# Patient Record
Sex: FEMALE | Race: WHITE | NOT HISPANIC OR LATINO | URBAN - METROPOLITAN AREA
[De-identification: names, ages, dates, MRNs, and addresses within clinical notes are randomized per-mention and may not be internally consistent; named-entity substitution may affect disease eponyms.]

---

## 2022-01-29 ENCOUNTER — PREPPED CHART (OUTPATIENT)
Dept: URBAN - METROPOLITAN AREA CLINIC 67 | Facility: CLINIC | Age: 62
End: 2022-01-29

## 2022-01-29 PROBLEM — H43.812 POSTERIOR VITREOUS DETACHMENT: Noted: 2022-01-29

## 2022-01-29 PROBLEM — H35.712 CSR: Noted: 2022-01-29

## 2022-01-29 PROBLEM — H43.812 POSTERIOR VITREOUS DETACHMENT: Status: STABILIZING | Noted: 2022-01-29

## 2022-01-29 PROBLEM — H43.391 VITREOUS FLOATERS: Noted: 2022-01-29

## 2022-01-29 PROBLEM — D31.32 CHOROIDAL NEVUS: Status: STABILIZING | Noted: 2022-01-29

## 2022-01-29 PROBLEM — H43.391 VITREOUS FLOATERS: Status: STABILIZING | Noted: 2022-01-29

## 2022-01-29 PROBLEM — H35.712 CSR: Status: STABILIZING | Noted: 2022-01-29

## 2022-01-29 PROBLEM — H33.322 ATROPHIC RETINAL HOLE: Status: WELL-CONTROLLED | Noted: 2022-01-29

## 2022-01-29 PROBLEM — H35.052 CHOROIDAL NEOVASCULARIZATION: Status: STABILIZING | Noted: 2022-01-29

## 2022-01-29 PROBLEM — H33.322 ATROPHIC RETINAL HOLE: Noted: 2022-01-29

## 2022-01-29 PROBLEM — H43.821 VITREOMACULAR ADHESION: Status: STABILIZING | Noted: 2022-01-29

## 2022-01-29 PROBLEM — D31.32 CHOROIDAL NEVUS: Noted: 2022-01-29

## 2022-01-29 PROBLEM — H43.821 VITREOMACULAR ADHESION: Noted: 2022-01-29

## 2022-01-29 PROBLEM — H35.052 CHOROIDAL NEOVASCULARIZATION: Noted: 2022-01-29

## 2022-03-15 ASSESSMENT — VISUAL ACUITY
OS_SC: 20/70-3
OS_PH: 20/25-2
OD_SC: 20/30-3

## 2022-03-15 ASSESSMENT — TONOMETRY
OD_IOP_MMHG: 14
OS_IOP_MMHG: 17

## 2022-04-02 ENCOUNTER — FOLLOW UP (OUTPATIENT)
Dept: URBAN - METROPOLITAN AREA CLINIC 67 | Facility: CLINIC | Age: 62
End: 2022-04-02

## 2022-04-02 DIAGNOSIS — H43.821: ICD-10-CM

## 2022-04-02 DIAGNOSIS — H33.322: ICD-10-CM

## 2022-04-02 DIAGNOSIS — H35.3221: ICD-10-CM

## 2022-04-02 DIAGNOSIS — H35.052: ICD-10-CM

## 2022-04-02 DIAGNOSIS — H35.712: ICD-10-CM

## 2022-04-02 PROCEDURE — 67028 INJECTION EYE DRUG: CPT

## 2022-04-02 PROCEDURE — 92202 OPSCPY EXTND ON/MAC DRAW: CPT | Mod: 59,RT

## 2022-04-02 PROCEDURE — PFS EYLEA PFS

## 2022-04-02 PROCEDURE — 92014 COMPRE OPH EXAM EST PT 1/>: CPT | Mod: 25

## 2022-04-02 PROCEDURE — 92134 CPTRZ OPH DX IMG PST SGM RTA: CPT

## 2022-04-02 ASSESSMENT — TONOMETRY
OD_IOP_MMHG: 13
OS_IOP_MMHG: 16

## 2022-04-02 ASSESSMENT — VISUAL ACUITY
OS_PH: 20/30
OD_SC: 20/25-2
OS_SC: 20/70-1

## 2022-06-25 ENCOUNTER — FOLLOW UP (OUTPATIENT)
Dept: URBAN - METROPOLITAN AREA CLINIC 67 | Facility: CLINIC | Age: 62
End: 2022-06-25

## 2022-06-25 DIAGNOSIS — H35.712: ICD-10-CM

## 2022-06-25 DIAGNOSIS — H35.3221: ICD-10-CM

## 2022-06-25 DIAGNOSIS — H43.821: ICD-10-CM

## 2022-06-25 DIAGNOSIS — H35.052: ICD-10-CM

## 2022-06-25 DIAGNOSIS — H33.322: ICD-10-CM

## 2022-06-25 PROCEDURE — 92014 COMPRE OPH EXAM EST PT 1/>: CPT | Mod: 25

## 2022-06-25 PROCEDURE — PFS EYLEA PFS

## 2022-06-25 PROCEDURE — 67028 INJECTION EYE DRUG: CPT

## 2022-06-25 PROCEDURE — 92202 OPSCPY EXTND ON/MAC DRAW: CPT | Mod: 59

## 2022-06-25 PROCEDURE — 92134 CPTRZ OPH DX IMG PST SGM RTA: CPT

## 2022-06-25 ASSESSMENT — VISUAL ACUITY
OS_PH: 20/25-2
OD_PH: 20/20
OS_SC: 20/40-2
OD_SC: 20/25-2

## 2022-06-25 ASSESSMENT — TONOMETRY
OS_IOP_MMHG: 14
OD_IOP_MMHG: 12

## 2022-08-31 ENCOUNTER — APPOINTMENT (RX ONLY)
Dept: URBAN - METROPOLITAN AREA CLINIC 40 | Facility: CLINIC | Age: 62
Setting detail: DERMATOLOGY
End: 2022-08-31

## 2022-08-31 DIAGNOSIS — Z41.9 ENCOUNTER FOR PROCEDURE FOR PURPOSES OTHER THAN REMEDYING HEALTH STATE, UNSPECIFIED: ICD-10-CM

## 2022-08-31 PROCEDURE — ? HYDRAFACIAL

## 2022-08-31 ASSESSMENT — LOCATION SIMPLE DESCRIPTION DERM
LOCATION SIMPLE: SUPERIOR FOREHEAD
LOCATION SIMPLE: CHIN
LOCATION SIMPLE: RIGHT CHEEK
LOCATION SIMPLE: LEFT CHEEK
LOCATION SIMPLE: NOSE

## 2022-08-31 ASSESSMENT — LOCATION ZONE DERM
LOCATION ZONE: NOSE
LOCATION ZONE: FACE

## 2022-08-31 ASSESSMENT — LOCATION DETAILED DESCRIPTION DERM
LOCATION DETAILED: LEFT CHIN
LOCATION DETAILED: SUPERIOR MID FOREHEAD
LOCATION DETAILED: NASAL DORSUM
LOCATION DETAILED: LEFT INFERIOR CENTRAL MALAR CHEEK
LOCATION DETAILED: RIGHT INFERIOR CENTRAL MALAR CHEEK

## 2022-08-31 NOTE — PROCEDURE: HYDRAFACIAL
Solution: Activ-4
Vacuum Pressure Low Setting (Will Not Render If Set To 0): 0
Tip: Hydropeel Tip, Clear
Indication: prominent pores
Treatment Number: 1
Tip: Hydropeel Tip, Teal
Tip Override
Comments: .\\n\\n\\n[employer] Deluxe. Orange exfoliation tip, glysal prep, extractions, soothing facial rinse, brightenol, protec, dermal repair, phyto corrective gel, intellishade clear. 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 She is also interested in injectables. I also suggested that she add a retinol to her home care routine.
Solution Override
Location: face
Solution: Beta-HD
Tip: Hydropeel Tip, Blue
Consent: Written consent obtained, risks reviewed including but not limited to crusting, scabbing, blistering, scarring, darker or lighter pigmentary change, bruising, and/or incomplete response.
Post-Care Instructions: I reviewed with the patient in detail post-care instructions. Patient should stay away from the sun and wear sun protection until treated areas are fully healed.
Solution: GlySal 7.5%
Price (Use Numbers Only, No Special Characters Or $): 300

## 2022-08-31 NOTE — HPI: COSMETIC (FACIAL)
Additional History: .\\n\\n\\nPrimary concerns are visible pores, especially on the chin. \\n\\nAllergies - penicillin\\n\\nHas a history of botox and fillers. She works in  and is rigorous about her home care. \\n\\nCurrent home care is [facility]-\\nCleanser\\ntoner\\nEye Cream\\nSerums\\nSPF 20 daily\\nnight cream\\nweekly mask\\n\\nPt would like a deep clean facial today focusing on extractions

## 2022-09-22 ENCOUNTER — APPOINTMENT (RX ONLY)
Dept: URBAN - METROPOLITAN AREA CLINIC 40 | Facility: CLINIC | Age: 62
Setting detail: DERMATOLOGY
End: 2022-09-22

## 2022-09-22 DIAGNOSIS — L66.8 OTHER CICATRICIAL ALOPECIA: ICD-10-CM | Status: INADEQUATELY CONTROLLED

## 2022-09-22 DIAGNOSIS — L66.12 FRONTAL FIBROSING ALOPECIA: ICD-10-CM | Status: INADEQUATELY CONTROLLED

## 2022-09-22 PROCEDURE — ? DIAGNOSIS COMMENT

## 2022-09-22 PROCEDURE — 99214 OFFICE O/P EST MOD 30 MIN: CPT

## 2022-09-22 PROCEDURE — ? PHOTO-DOCUMENTATION

## 2022-09-22 PROCEDURE — ? ADDITIONAL NOTES

## 2022-09-22 PROCEDURE — ? COUNSELING

## 2022-09-22 PROCEDURE — ? PRESCRIPTION

## 2022-09-22 PROCEDURE — ? ORDER TESTS

## 2022-09-22 PROCEDURE — ? PRESCRIPTION MEDICATION MANAGEMENT

## 2022-09-22 RX ORDER — TACROLIMUS 1 MG/G
OINTMENT TOPICAL
Qty: 60 | Refills: 0 | Status: ERX | COMMUNITY
Start: 2022-09-22

## 2022-09-22 RX ADMIN — TACROLIMUS: 1 OINTMENT TOPICAL at 00:00

## 2022-09-22 ASSESSMENT — LOCATION ZONE DERM
LOCATION ZONE: SCALP
LOCATION ZONE: FACE

## 2022-09-22 ASSESSMENT — LOCATION SIMPLE DESCRIPTION DERM
LOCATION SIMPLE: POSTERIOR SCALP
LOCATION SIMPLE: LEFT FOREHEAD
LOCATION SIMPLE: RIGHT SCALP

## 2022-09-22 ASSESSMENT — LOCATION DETAILED DESCRIPTION DERM
LOCATION DETAILED: LEFT SUPERIOR OCCIPITAL SCALP
LOCATION DETAILED: RIGHT MEDIAL FRONTAL SCALP
LOCATION DETAILED: LEFT SUPERIOR FOREHEAD

## 2022-09-22 NOTE — PROCEDURE: DIAGNOSIS COMMENT
Detail Level: Simple
Render Risk Assessment In Note?: no
Comment: Patient states biopsy was done a few years ago- Dr. Noyola’s office, will have Pt sign consent to get results. Discussed topi tacrolimus and ILK injections. Patient would like to start with topical Rx today. BW order given to patient today.

## 2022-09-22 NOTE — PROCEDURE: ORDER TESTS
Expected Date Of Service: 09/22/2022
Performing Laboratory: 0
Bill For Surgical Tray: no
Billing Type: Third-Party Bill

## 2022-09-22 NOTE — PROCEDURE: PRESCRIPTION MEDICATION MANAGEMENT
Initiate Treatment: - tacrolimus 0.1 % topical ointment \\nSig: Apply to AA on scalp daily
Detail Level: Zone
Render In Strict Bullet Format?: No
Plan: - recommended proscriptix or nutrafol

## 2022-09-28 RX ORDER — TACROLIMUS 1 MG/G
OINTMENT TOPICAL
Qty: 60 | Refills: 0 | Status: CANCELLED
Stop reason: CLARIF

## 2022-10-01 ENCOUNTER — FOLLOW UP (OUTPATIENT)
Dept: URBAN - METROPOLITAN AREA CLINIC 67 | Facility: CLINIC | Age: 62
End: 2022-10-01

## 2022-10-01 DIAGNOSIS — H43.812: ICD-10-CM

## 2022-10-01 DIAGNOSIS — H33.322: ICD-10-CM

## 2022-10-01 DIAGNOSIS — H43.391: ICD-10-CM

## 2022-10-01 DIAGNOSIS — H35.712: ICD-10-CM

## 2022-10-01 DIAGNOSIS — H35.052: ICD-10-CM

## 2022-10-01 DIAGNOSIS — D31.32: ICD-10-CM

## 2022-10-01 DIAGNOSIS — H43.821: ICD-10-CM

## 2022-10-01 DIAGNOSIS — H35.3221: ICD-10-CM

## 2022-10-01 PROCEDURE — 92134 CPTRZ OPH DX IMG PST SGM RTA: CPT

## 2022-10-01 PROCEDURE — 92202 OPSCPY EXTND ON/MAC DRAW: CPT

## 2022-10-01 PROCEDURE — 92014 COMPRE OPH EXAM EST PT 1/>: CPT

## 2022-10-01 ASSESSMENT — VISUAL ACUITY
OD_SC: 20/20
OS_PH: 20/25-1
OS_SC: 20/30

## 2022-10-01 ASSESSMENT — TONOMETRY
OS_IOP_MMHG: 12
OD_IOP_MMHG: 10

## 2022-12-03 ENCOUNTER — FOLLOW UP (OUTPATIENT)
Dept: URBAN - METROPOLITAN AREA CLINIC 67 | Facility: CLINIC | Age: 62
End: 2022-12-03

## 2022-12-03 DIAGNOSIS — H43.812: ICD-10-CM

## 2022-12-03 DIAGNOSIS — H35.3221: ICD-10-CM

## 2022-12-03 DIAGNOSIS — H33.322: ICD-10-CM

## 2022-12-03 DIAGNOSIS — H43.391: ICD-10-CM

## 2022-12-03 DIAGNOSIS — H25.13: ICD-10-CM

## 2022-12-03 DIAGNOSIS — H43.821: ICD-10-CM

## 2022-12-03 DIAGNOSIS — H35.052: ICD-10-CM

## 2022-12-03 DIAGNOSIS — D31.32: ICD-10-CM

## 2022-12-03 DIAGNOSIS — H35.712: ICD-10-CM

## 2022-12-03 PROCEDURE — 92014 COMPRE OPH EXAM EST PT 1/>: CPT

## 2022-12-03 PROCEDURE — 92201 OPSCPY EXTND RTA DRAW UNI/BI: CPT

## 2022-12-03 PROCEDURE — 92134 CPTRZ OPH DX IMG PST SGM RTA: CPT

## 2022-12-03 ASSESSMENT — VISUAL ACUITY
OS_SC: 20/40-1
OD_PH: 20/20
OS_PH: 20/20-3
OD_SC: 20/25-1

## 2022-12-03 ASSESSMENT — TONOMETRY
OD_IOP_MMHG: 12
OS_IOP_MMHG: 16

## 2023-02-25 ENCOUNTER — FOLLOW UP (OUTPATIENT)
Dept: URBAN - METROPOLITAN AREA CLINIC 67 | Facility: CLINIC | Age: 63
End: 2023-02-25

## 2023-02-25 DIAGNOSIS — H43.821: ICD-10-CM

## 2023-02-25 DIAGNOSIS — H35.052: ICD-10-CM

## 2023-02-25 DIAGNOSIS — H33.322: ICD-10-CM

## 2023-02-25 DIAGNOSIS — H35.712: ICD-10-CM

## 2023-02-25 DIAGNOSIS — H25.13: ICD-10-CM

## 2023-02-25 DIAGNOSIS — D31.32: ICD-10-CM

## 2023-02-25 DIAGNOSIS — H43.812: ICD-10-CM

## 2023-02-25 DIAGNOSIS — H35.3221: ICD-10-CM

## 2023-02-25 DIAGNOSIS — H43.391: ICD-10-CM

## 2023-02-25 DIAGNOSIS — H35.072: ICD-10-CM

## 2023-02-25 PROCEDURE — 92134 CPTRZ OPH DX IMG PST SGM RTA: CPT

## 2023-02-25 PROCEDURE — 92014 COMPRE OPH EXAM EST PT 1/>: CPT

## 2023-02-25 PROCEDURE — 92201 OPSCPY EXTND RTA DRAW UNI/BI: CPT

## 2023-02-25 ASSESSMENT — VISUAL ACUITY
OD_SC: 20/25-1
OS_SC: 20/40-3
OD_PH: 20/20-1

## 2023-02-25 ASSESSMENT — TONOMETRY
OD_IOP_MMHG: 15
OS_IOP_MMHG: 15

## 2023-05-20 ENCOUNTER — FOLLOW UP (OUTPATIENT)
Dept: URBAN - METROPOLITAN AREA CLINIC 67 | Facility: CLINIC | Age: 63
End: 2023-05-20

## 2023-05-20 DIAGNOSIS — H43.821: ICD-10-CM

## 2023-05-20 DIAGNOSIS — H35.3221: ICD-10-CM

## 2023-05-20 DIAGNOSIS — H35.072: ICD-10-CM

## 2023-05-20 DIAGNOSIS — H33.322: ICD-10-CM

## 2023-05-20 DIAGNOSIS — H43.812: ICD-10-CM

## 2023-05-20 DIAGNOSIS — H43.391: ICD-10-CM

## 2023-05-20 DIAGNOSIS — H35.712: ICD-10-CM

## 2023-05-20 DIAGNOSIS — D31.32: ICD-10-CM

## 2023-05-20 PROCEDURE — 92134 CPTRZ OPH DX IMG PST SGM RTA: CPT

## 2023-05-20 PROCEDURE — J0178S EYLEA PFS SAMPLE

## 2023-05-20 PROCEDURE — 92202 OPSCPY EXTND ON/MAC DRAW: CPT | Mod: 59

## 2023-05-20 PROCEDURE — 67028 INJECTION EYE DRUG: CPT

## 2023-05-20 PROCEDURE — 92014 COMPRE OPH EXAM EST PT 1/>: CPT | Mod: 25

## 2023-05-20 ASSESSMENT — TONOMETRY
OS_IOP_MMHG: 15
OD_IOP_MMHG: 15

## 2023-05-20 ASSESSMENT — VISUAL ACUITY
OS_CC: 20/50-2
OD_CC: 20/30+2

## 2023-07-01 ENCOUNTER — FOLLOW UP (OUTPATIENT)
Dept: URBAN - METROPOLITAN AREA CLINIC 67 | Facility: CLINIC | Age: 63
End: 2023-07-01

## 2023-07-01 DIAGNOSIS — H43.812: ICD-10-CM

## 2023-07-01 DIAGNOSIS — H35.712: ICD-10-CM

## 2023-07-01 DIAGNOSIS — H35.371: ICD-10-CM

## 2023-07-01 DIAGNOSIS — H35.3221: ICD-10-CM

## 2023-07-01 DIAGNOSIS — H43.821: ICD-10-CM

## 2023-07-01 PROCEDURE — 92134 CPTRZ OPH DX IMG PST SGM RTA: CPT

## 2023-07-01 PROCEDURE — 92014 COMPRE OPH EXAM EST PT 1/>: CPT

## 2023-07-01 PROCEDURE — 92201 OPSCPY EXTND RTA DRAW UNI/BI: CPT

## 2023-07-01 ASSESSMENT — VISUAL ACUITY
OS_PH: 20/20-2
OD_SC: 20/25-1
OS_SC: 20/40-2

## 2023-07-01 ASSESSMENT — TONOMETRY
OD_IOP_MMHG: 10
OS_IOP_MMHG: 11

## 2023-08-12 ENCOUNTER — FOLLOW UP (OUTPATIENT)
Dept: URBAN - METROPOLITAN AREA CLINIC 67 | Facility: CLINIC | Age: 63
End: 2023-08-12

## 2023-08-12 DIAGNOSIS — H43.821: ICD-10-CM

## 2023-08-12 DIAGNOSIS — H35.3221: ICD-10-CM

## 2023-08-12 DIAGNOSIS — H35.371: ICD-10-CM

## 2023-08-12 PROCEDURE — 67028 INJECTION EYE DRUG: CPT

## 2023-08-12 PROCEDURE — 92134 CPTRZ OPH DX IMG PST SGM RTA: CPT

## 2023-08-12 PROCEDURE — 92014 COMPRE OPH EXAM EST PT 1/>: CPT | Mod: 25

## 2023-08-12 PROCEDURE — PFS EYLEA PFS: Mod: JZ

## 2023-08-12 PROCEDURE — 92202 OPSCPY EXTND ON/MAC DRAW: CPT | Mod: 59

## 2023-08-12 ASSESSMENT — VISUAL ACUITY
OS_PH: 20/25-1
OD_CC: 20/30
OS_CC: 20/50-1
OD_PH: 20/20-2

## 2023-08-12 ASSESSMENT — TONOMETRY
OS_IOP_MMHG: 15
OD_IOP_MMHG: 12

## 2023-09-23 ENCOUNTER — FOLLOW UP (OUTPATIENT)
Dept: URBAN - METROPOLITAN AREA CLINIC 67 | Facility: CLINIC | Age: 63
End: 2023-09-23

## 2023-09-23 DIAGNOSIS — H33.322: ICD-10-CM

## 2023-09-23 DIAGNOSIS — H35.072: ICD-10-CM

## 2023-09-23 DIAGNOSIS — H43.812: ICD-10-CM

## 2023-09-23 DIAGNOSIS — H43.821: ICD-10-CM

## 2023-09-23 DIAGNOSIS — H35.3221: ICD-10-CM

## 2023-09-23 PROCEDURE — 92134 CPTRZ OPH DX IMG PST SGM RTA: CPT

## 2023-09-23 PROCEDURE — 92202 OPSCPY EXTND ON/MAC DRAW: CPT

## 2023-09-23 PROCEDURE — 92014 COMPRE OPH EXAM EST PT 1/>: CPT

## 2023-09-23 ASSESSMENT — VISUAL ACUITY
OS_CC: 20/40-2
OD_CC: 20/25+2

## 2023-09-23 ASSESSMENT — TONOMETRY
OD_IOP_MMHG: 13
OS_IOP_MMHG: 14

## 2023-11-04 ENCOUNTER — FOLLOW UP (OUTPATIENT)
Dept: URBAN - METROPOLITAN AREA CLINIC 67 | Facility: CLINIC | Age: 63
End: 2023-11-04

## 2023-11-04 DIAGNOSIS — H35.072: ICD-10-CM

## 2023-11-04 DIAGNOSIS — H43.821: ICD-10-CM

## 2023-11-04 DIAGNOSIS — D31.32: ICD-10-CM

## 2023-11-04 DIAGNOSIS — H43.812: ICD-10-CM

## 2023-11-04 DIAGNOSIS — H35.3221: ICD-10-CM

## 2023-11-04 DIAGNOSIS — H35.371: ICD-10-CM

## 2023-11-04 DIAGNOSIS — H43.391: ICD-10-CM

## 2023-11-04 PROCEDURE — 92014 COMPRE OPH EXAM EST PT 1/>: CPT

## 2023-11-04 PROCEDURE — 92134 CPTRZ OPH DX IMG PST SGM RTA: CPT

## 2023-11-04 ASSESSMENT — TONOMETRY
OS_IOP_MMHG: 13
OD_IOP_MMHG: 12

## 2023-11-04 ASSESSMENT — VISUAL ACUITY
OD_PH: 20/20-1
OD_CC: 20/25-1
OS_CC: 20/40-2
OS_PH: 20/25-2

## 2024-01-06 ENCOUNTER — FOLLOW UP (OUTPATIENT)
Dept: URBAN - METROPOLITAN AREA CLINIC 67 | Facility: CLINIC | Age: 64
End: 2024-01-06

## 2024-01-06 DIAGNOSIS — H35.371: ICD-10-CM

## 2024-01-06 DIAGNOSIS — H35.3221: ICD-10-CM

## 2024-01-06 PROCEDURE — 67028 INJECTION EYE DRUG: CPT

## 2024-01-06 PROCEDURE — 92202 OPSCPY EXTND ON/MAC DRAW: CPT | Mod: 59

## 2024-01-06 PROCEDURE — 92134 CPTRZ OPH DX IMG PST SGM RTA: CPT

## 2024-01-06 PROCEDURE — PFS EYLEA PFS: Mod: JZ

## 2024-01-06 PROCEDURE — 92014 COMPRE OPH EXAM EST PT 1/>: CPT | Mod: 25

## 2024-01-06 ASSESSMENT — VISUAL ACUITY
OS_PH: 20/25-1
OD_PH: 20/20
OS_SC: 20/40-2
OD_SC: 20/40+2

## 2024-01-06 ASSESSMENT — TONOMETRY
OD_IOP_MMHG: 12
OS_IOP_MMHG: 14

## 2024-03-09 ENCOUNTER — FOLLOW UP (OUTPATIENT)
Dept: URBAN - METROPOLITAN AREA CLINIC 67 | Facility: CLINIC | Age: 64
End: 2024-03-09

## 2024-03-09 DIAGNOSIS — H43.812: ICD-10-CM

## 2024-03-09 DIAGNOSIS — H43.391: ICD-10-CM

## 2024-03-09 DIAGNOSIS — H35.072: ICD-10-CM

## 2024-03-09 DIAGNOSIS — H33.322: ICD-10-CM

## 2024-03-09 DIAGNOSIS — H43.821: ICD-10-CM

## 2024-03-09 DIAGNOSIS — D31.32: ICD-10-CM

## 2024-03-09 DIAGNOSIS — H35.371: ICD-10-CM

## 2024-03-09 DIAGNOSIS — H25.13: ICD-10-CM

## 2024-03-09 DIAGNOSIS — H35.3221: ICD-10-CM

## 2024-03-09 DIAGNOSIS — H35.712: ICD-10-CM

## 2024-03-09 PROCEDURE — 92014 COMPRE OPH EXAM EST PT 1/>: CPT | Mod: 25

## 2024-03-09 PROCEDURE — 92202 OPSCPY EXTND ON/MAC DRAW: CPT | Mod: 59

## 2024-03-09 PROCEDURE — PFS EYLEA PFS: Mod: JZ

## 2024-03-09 PROCEDURE — 67028 INJECTION EYE DRUG: CPT

## 2024-03-09 PROCEDURE — 92134 CPTRZ OPH DX IMG PST SGM RTA: CPT

## 2024-03-09 ASSESSMENT — TONOMETRY
OD_IOP_MMHG: 14
OS_IOP_MMHG: 14

## 2024-03-09 ASSESSMENT — VISUAL ACUITY
OS_CC: 20/50+1
OS_PH: 20/30-2
OD_CC: 20/25

## 2024-05-11 ENCOUNTER — FOLLOW UP (OUTPATIENT)
Dept: URBAN - METROPOLITAN AREA CLINIC 67 | Facility: CLINIC | Age: 64
End: 2024-05-11

## 2024-05-11 DIAGNOSIS — H35.371: ICD-10-CM

## 2024-05-11 DIAGNOSIS — D31.32: ICD-10-CM

## 2024-05-11 DIAGNOSIS — H43.821: ICD-10-CM

## 2024-05-11 DIAGNOSIS — H35.712: ICD-10-CM

## 2024-05-11 DIAGNOSIS — H25.13: ICD-10-CM

## 2024-05-11 DIAGNOSIS — H33.322: ICD-10-CM

## 2024-05-11 DIAGNOSIS — H43.391: ICD-10-CM

## 2024-05-11 DIAGNOSIS — H04.123: ICD-10-CM

## 2024-05-11 DIAGNOSIS — H35.3221: ICD-10-CM

## 2024-05-11 DIAGNOSIS — H35.072: ICD-10-CM

## 2024-05-11 DIAGNOSIS — H43.812: ICD-10-CM

## 2024-05-11 PROCEDURE — 92134 CPTRZ OPH DX IMG PST SGM RTA: CPT

## 2024-05-11 PROCEDURE — 92202 OPSCPY EXTND ON/MAC DRAW: CPT | Mod: 59

## 2024-05-11 PROCEDURE — PFS EYLEA PFS: Mod: JZ

## 2024-05-11 PROCEDURE — 67028 INJECTION EYE DRUG: CPT

## 2024-05-11 PROCEDURE — 92014 COMPRE OPH EXAM EST PT 1/>: CPT | Mod: 25

## 2024-05-11 ASSESSMENT — TONOMETRY
OD_IOP_MMHG: 10
OS_IOP_MMHG: 10

## 2024-05-11 ASSESSMENT — VISUAL ACUITY
OD_CC: 20/30
OS_CC: 20/50-3
OS_PH: 20/40

## 2024-08-03 ENCOUNTER — FOLLOW UP (OUTPATIENT)
Dept: URBAN - METROPOLITAN AREA CLINIC 67 | Facility: CLINIC | Age: 64
End: 2024-08-03

## 2024-08-03 DIAGNOSIS — H43.391: ICD-10-CM

## 2024-08-03 DIAGNOSIS — H04.123: ICD-10-CM

## 2024-08-03 DIAGNOSIS — D31.32: ICD-10-CM

## 2024-08-03 DIAGNOSIS — H35.371: ICD-10-CM

## 2024-08-03 DIAGNOSIS — H43.812: ICD-10-CM

## 2024-08-03 DIAGNOSIS — H35.3221: ICD-10-CM

## 2024-08-03 DIAGNOSIS — H33.322: ICD-10-CM

## 2024-08-03 DIAGNOSIS — H35.712: ICD-10-CM

## 2024-08-03 DIAGNOSIS — H43.821: ICD-10-CM

## 2024-08-03 DIAGNOSIS — H25.13: ICD-10-CM

## 2024-08-03 DIAGNOSIS — H35.072: ICD-10-CM

## 2024-08-03 PROCEDURE — PFS EYLEA PFS: Mod: JZ

## 2024-08-03 PROCEDURE — 67028 INJECTION EYE DRUG: CPT

## 2024-08-03 PROCEDURE — 92014 COMPRE OPH EXAM EST PT 1/>: CPT | Mod: 25

## 2024-08-03 PROCEDURE — 92134 CPTRZ OPH DX IMG PST SGM RTA: CPT

## 2024-08-03 PROCEDURE — 92202 OPSCPY EXTND ON/MAC DRAW: CPT | Mod: 59

## 2024-08-03 ASSESSMENT — VISUAL ACUITY
OD_CC: 20/30
OS_CC: 20/50-1
OS_PH: 20/40

## 2024-08-03 ASSESSMENT — TONOMETRY
OD_IOP_MMHG: 11
OS_IOP_MMHG: 10

## 2024-11-02 ENCOUNTER — FOLLOW UP (OUTPATIENT)
Dept: URBAN - METROPOLITAN AREA CLINIC 67 | Facility: CLINIC | Age: 64
End: 2024-11-02

## 2024-11-02 DIAGNOSIS — D31.32: ICD-10-CM

## 2024-11-02 DIAGNOSIS — H35.371: ICD-10-CM

## 2024-11-02 DIAGNOSIS — H43.391: ICD-10-CM

## 2024-11-02 DIAGNOSIS — H33.322: ICD-10-CM

## 2024-11-02 DIAGNOSIS — H43.821: ICD-10-CM

## 2024-11-02 DIAGNOSIS — H35.712: ICD-10-CM

## 2024-11-02 DIAGNOSIS — H35.072: ICD-10-CM

## 2024-11-02 DIAGNOSIS — H43.812: ICD-10-CM

## 2024-11-02 DIAGNOSIS — H35.3221: ICD-10-CM

## 2024-11-02 PROCEDURE — 92014 COMPRE OPH EXAM EST PT 1/>: CPT | Mod: 25

## 2024-11-02 PROCEDURE — 67028 INJECTION EYE DRUG: CPT

## 2024-11-02 PROCEDURE — 92202 OPSCPY EXTND ON/MAC DRAW: CPT | Mod: 59

## 2024-11-02 PROCEDURE — PFS EYLEA PFS: Mod: JZ

## 2024-11-02 PROCEDURE — 92134 CPTRZ OPH DX IMG PST SGM RTA: CPT

## 2024-11-02 ASSESSMENT — VISUAL ACUITY
OS_CC: 20/40-1
OD_CC: 20/30

## 2024-11-02 ASSESSMENT — TONOMETRY
OD_IOP_MMHG: 12
OS_IOP_MMHG: 12

## 2025-02-01 ENCOUNTER — FOLLOW UP (OUTPATIENT)
Dept: URBAN - METROPOLITAN AREA CLINIC 67 | Facility: CLINIC | Age: 65
End: 2025-02-01

## 2025-02-01 DIAGNOSIS — H35.3221: ICD-10-CM

## 2025-02-01 DIAGNOSIS — H43.812: ICD-10-CM

## 2025-02-01 DIAGNOSIS — H25.13: ICD-10-CM

## 2025-02-01 DIAGNOSIS — H43.821: ICD-10-CM

## 2025-02-01 DIAGNOSIS — D31.32: ICD-10-CM

## 2025-02-01 DIAGNOSIS — H35.712: ICD-10-CM

## 2025-02-01 DIAGNOSIS — H04.123: ICD-10-CM

## 2025-02-01 DIAGNOSIS — H33.322: ICD-10-CM

## 2025-02-01 DIAGNOSIS — H35.072: ICD-10-CM

## 2025-02-01 DIAGNOSIS — H43.391: ICD-10-CM

## 2025-02-01 DIAGNOSIS — H35.371: ICD-10-CM

## 2025-02-01 PROCEDURE — 67028 INJECTION EYE DRUG: CPT

## 2025-02-01 PROCEDURE — 92014 COMPRE OPH EXAM EST PT 1/>: CPT | Mod: 25

## 2025-02-01 PROCEDURE — 92134 CPTRZ OPH DX IMG PST SGM RTA: CPT

## 2025-02-01 PROCEDURE — PFS EYLEA PFS: Mod: JZ

## 2025-02-01 PROCEDURE — 92202 OPSCPY EXTND ON/MAC DRAW: CPT | Mod: 59

## 2025-02-01 ASSESSMENT — VISUAL ACUITY
OS_PH: 20/40
OS_SC: 20/50
OD_SC: 20/30

## 2025-02-01 ASSESSMENT — TONOMETRY
OD_IOP_MMHG: 12
OS_IOP_MMHG: 14

## 2025-04-05 ENCOUNTER — FOLLOW UP (OUTPATIENT)
Dept: URBAN - METROPOLITAN AREA CLINIC 67 | Facility: CLINIC | Age: 65
End: 2025-04-05

## 2025-04-05 DIAGNOSIS — D31.32: ICD-10-CM

## 2025-04-05 DIAGNOSIS — H43.812: ICD-10-CM

## 2025-04-05 DIAGNOSIS — H43.391: ICD-10-CM

## 2025-04-05 DIAGNOSIS — H35.371: ICD-10-CM

## 2025-04-05 DIAGNOSIS — H33.322: ICD-10-CM

## 2025-04-05 DIAGNOSIS — H35.072: ICD-10-CM

## 2025-04-05 DIAGNOSIS — H35.3221: ICD-10-CM

## 2025-04-05 DIAGNOSIS — H35.712: ICD-10-CM

## 2025-04-05 PROCEDURE — 92014 COMPRE OPH EXAM EST PT 1/>: CPT | Mod: 25

## 2025-04-05 PROCEDURE — PFS EYLEA PFS: Mod: JZ

## 2025-04-05 PROCEDURE — 67028 INJECTION EYE DRUG: CPT

## 2025-04-05 PROCEDURE — 92134 CPTRZ OPH DX IMG PST SGM RTA: CPT

## 2025-04-05 PROCEDURE — 92202 OPSCPY EXTND ON/MAC DRAW: CPT | Mod: 59

## 2025-04-05 ASSESSMENT — VISUAL ACUITY
OD_PH: 20/25
OS_PH: 20/30+1
OD_SC: 20/40-1
OS_SC: 20/50-1

## 2025-04-05 ASSESSMENT — TONOMETRY
OD_IOP_MMHG: 13
OS_IOP_MMHG: 14

## 2025-06-28 ENCOUNTER — FOLLOW UP (OUTPATIENT)
Dept: URBAN - METROPOLITAN AREA CLINIC 67 | Facility: CLINIC | Age: 65
End: 2025-06-28

## 2025-06-28 DIAGNOSIS — H35.712: ICD-10-CM

## 2025-06-28 DIAGNOSIS — H35.072: ICD-10-CM

## 2025-06-28 DIAGNOSIS — H43.391: ICD-10-CM

## 2025-06-28 DIAGNOSIS — D31.32: ICD-10-CM

## 2025-06-28 DIAGNOSIS — H33.322: ICD-10-CM

## 2025-06-28 DIAGNOSIS — H35.371: ICD-10-CM

## 2025-06-28 DIAGNOSIS — H43.812: ICD-10-CM

## 2025-06-28 DIAGNOSIS — H35.3221: ICD-10-CM

## 2025-06-28 PROCEDURE — 92134 CPTRZ OPH DX IMG PST SGM RTA: CPT

## 2025-06-28 PROCEDURE — PFS EYLEA PFS: Mod: JZ

## 2025-06-28 PROCEDURE — 67028 INJECTION EYE DRUG: CPT

## 2025-06-28 PROCEDURE — 92202 OPSCPY EXTND ON/MAC DRAW: CPT | Mod: 59

## 2025-06-28 PROCEDURE — 92014 COMPRE OPH EXAM EST PT 1/>: CPT | Mod: 25

## 2025-06-28 ASSESSMENT — VISUAL ACUITY
OD_PH: 20/20-2
OS_CC: 20/50-2
OS_PH: 20/20-1
OD_CC: 20/40-1

## 2025-06-28 ASSESSMENT — TONOMETRY
OD_IOP_MMHG: 14
OS_IOP_MMHG: 12

## 2025-08-16 ENCOUNTER — FOLLOW UP (OUTPATIENT)
Dept: URBAN - METROPOLITAN AREA CLINIC 67 | Facility: CLINIC | Age: 65
End: 2025-08-16

## 2025-08-16 DIAGNOSIS — H35.371: ICD-10-CM

## 2025-08-16 DIAGNOSIS — H43.391: ICD-10-CM

## 2025-08-16 DIAGNOSIS — H35.712: ICD-10-CM

## 2025-08-16 DIAGNOSIS — H35.072: ICD-10-CM

## 2025-08-16 DIAGNOSIS — H35.3221: ICD-10-CM

## 2025-08-16 DIAGNOSIS — D31.32: ICD-10-CM

## 2025-08-16 DIAGNOSIS — H43.812: ICD-10-CM

## 2025-08-16 DIAGNOSIS — H33.322: ICD-10-CM

## 2025-08-16 PROCEDURE — 92202 OPSCPY EXTND ON/MAC DRAW: CPT | Mod: 59

## 2025-08-16 PROCEDURE — PFS EYLEA PFS: Mod: JZ

## 2025-08-16 PROCEDURE — 92134 CPTRZ OPH DX IMG PST SGM RTA: CPT

## 2025-08-16 PROCEDURE — 67028 INJECTION EYE DRUG: CPT

## 2025-08-16 PROCEDURE — 92014 COMPRE OPH EXAM EST PT 1/>: CPT | Mod: 25

## 2025-08-16 ASSESSMENT — TONOMETRY
OS_IOP_MMHG: 14
OD_IOP_MMHG: 13

## 2025-08-16 ASSESSMENT — VISUAL ACUITY
OD_CC: 20/30-1
OS_CC: 20/50-2